# Patient Record
Sex: MALE | Race: WHITE | NOT HISPANIC OR LATINO | Employment: OTHER | URBAN - METROPOLITAN AREA
[De-identification: names, ages, dates, MRNs, and addresses within clinical notes are randomized per-mention and may not be internally consistent; named-entity substitution may affect disease eponyms.]

---

## 2018-01-23 ENCOUNTER — OFFICE VISIT - HEALTHEAST (OUTPATIENT)
Dept: CARDIOLOGY | Facility: CLINIC | Age: 63
End: 2018-01-23

## 2018-01-23 DIAGNOSIS — R07.89 CHEST TIGHTNESS: ICD-10-CM

## 2018-01-23 ASSESSMENT — MIFFLIN-ST. JEOR: SCORE: 1698.44

## 2018-02-01 ENCOUNTER — COMMUNICATION - HEALTHEAST (OUTPATIENT)
Dept: TELEHEALTH | Facility: CLINIC | Age: 63
End: 2018-02-01

## 2018-02-01 ENCOUNTER — HOSPITAL ENCOUNTER (OUTPATIENT)
Dept: CARDIOLOGY | Facility: CLINIC | Age: 63
Discharge: HOME | End: 2018-02-01
Attending: INTERNAL MEDICINE

## 2018-02-01 DIAGNOSIS — R07.89 CHEST TIGHTNESS: ICD-10-CM

## 2018-02-01 LAB
CV STRESS CURRENT BP HE: NORMAL
CV STRESS CURRENT HR HE: 102
CV STRESS CURRENT HR HE: 104
CV STRESS CURRENT HR HE: 105
CV STRESS CURRENT HR HE: 105
CV STRESS CURRENT HR HE: 106
CV STRESS CURRENT HR HE: 106
CV STRESS CURRENT HR HE: 108
CV STRESS CURRENT HR HE: 110
CV STRESS CURRENT HR HE: 111
CV STRESS CURRENT HR HE: 111
CV STRESS CURRENT HR HE: 112
CV STRESS CURRENT HR HE: 113
CV STRESS CURRENT HR HE: 115
CV STRESS CURRENT HR HE: 124
CV STRESS CURRENT HR HE: 125
CV STRESS CURRENT HR HE: 134
CV STRESS CURRENT HR HE: 134
CV STRESS CURRENT HR HE: 136
CV STRESS CURRENT HR HE: 139
CV STRESS CURRENT HR HE: 140
CV STRESS CURRENT HR HE: 143
CV STRESS CURRENT HR HE: 154
CV STRESS CURRENT HR HE: 157
CV STRESS CURRENT HR HE: 160
CV STRESS CURRENT HR HE: 160
CV STRESS CURRENT HR HE: 163
CV STRESS CURRENT HR HE: 168
CV STRESS CURRENT HR HE: 168
CV STRESS CURRENT HR HE: 82
CV STRESS CURRENT HR HE: 87
CV STRESS DEVIATION TIME HE: NORMAL
CV STRESS ECHO PERCENT HR HE: NORMAL
CV STRESS EXERCISE STAGE HE: NORMAL
CV STRESS FINAL RESTING BP HE: NORMAL
CV STRESS FINAL RESTING HR HE: 110
CV STRESS MAX HR HE: 169
CV STRESS MAX TREADMILL GRADE HE: 14
CV STRESS MAX TREADMILL SPEED HE: 3.4
CV STRESS PEAK DIA BP HE: NORMAL
CV STRESS PEAK SYS BP HE: NORMAL
CV STRESS PHASE HE: NORMAL
CV STRESS PROTOCOL HE: NORMAL
CV STRESS RESTING PT POSITION HE: NORMAL
CV STRESS RESTING PT POSITION HE: NORMAL
CV STRESS ST DEVIATION AMOUNT HE: NORMAL
CV STRESS ST DEVIATION ELEVATION HE: NORMAL
CV STRESS ST EVELATION AMOUNT HE: NORMAL
CV STRESS TEST TYPE HE: NORMAL
CV STRESS TOTAL STAGE TIME MIN 1 HE: NORMAL
STRESS ECHO BASELINE BP: NORMAL
STRESS ECHO BASELINE HR: 82
STRESS ECHO CALCULATED PERCENT HR: 106 %
STRESS ECHO LAST STRESS BP: NORMAL
STRESS ECHO LAST STRESS HR: 168
STRESS ECHO POST ESTIMATED WORKLOAD: 10.3
STRESS ECHO POST EXERCISE DUR MIN: 8
STRESS ECHO POST EXERCISE DUR SEC: 59
STRESS ECHO TARGET HR: 135

## 2021-05-31 VITALS — WEIGHT: 186 LBS | BODY MASS INDEX: 23.87 KG/M2 | HEIGHT: 74 IN

## 2021-06-15 NOTE — PROGRESS NOTES
Consultation - [facility]  Rakesh Pulido,  1955, MRN [id number]    PCP: Fran Huber MD, [phone]    Assessment and Plan: Atypical chest symptom does not appear to be an ischemic syndrome no evidence for myocardial ischemia but given his age and sex would suggest a evaluation by stress testing to exclude potential Heart Association.  Have recommended taking one baby aspirin daily.  Recommendations: Stress echo    Chief Complaint: Chest tightness    HPI:  We have been requested by ER  to evaluate Rakesh Pulido for consultation who is a  62 y.o. year old male for RAC.   Hx: 62-year-old man who is been in good health is evaluated for an episode of chest tightness.  Yesterday we had a heavy snowfall between 12 and 15 inches.  Last night and this morning he has been out vigorously snowplowing and shoveling snow at his residence and and neighbors as well.  He was driving his mother into a medical appointment he started notice a very central localized tightness of the low sternum the symptoms gradually resolved.  He has never had this symptom before.  He went to the emergency room for evaluation and no evidence of myocardial infarction or alternative cause was identified.  He was released and came here for further evaluation.  The symptoms remains resolved.  He has never had a similar symptom.  No associated symptoms with this episode.  History of hypertension diabetes hyperlipidemia tobacco smoking or family history of early onset heart disease.    Medical History  Active Ambulatory (Non-Hospital) Problems    Diagnosis     Chest tightness     No past medical history on file.    Surgical History  He  has no past surgical history on file.    Social History  Reviewed, and he  reports that he has never smoked. He has never used smokeless tobacco. He reports that he does not use illicit drugs.  Smoking status reviewed.  Social history othrwise not contributory to HPI.  Allergies  Allergies   Allergen  Reactions     Penicillins        Family History  Reviewed, and family history is not on file.  Extended Emergency Contact Information  Primary Emergency Contact: Michelle Pulido  [address] [address] [place]  Mobile Phone: [phone]  Relation: Spouse  Family history otherwise negative or not conributory to HPI.    Psychosocial Needs  Social History     Social History Narrative     No narrative on file     Additional psychosocial needs reviewed per nursing assessment.    Prior to Admission Medications    (Not in a hospital admission)    Review of Systems:  A 12 point comprehensive review of systems was negative except as noted.  Review of systems is negative except for HPI  Physical Exam:  Less than 10 mL of urine  Vitals:    01/23/18 1551   BP: 122/78   Pulse: 84   Resp: 16     Head and neck without focal cranial neurologic defects.  JVD not distended.  Carotid upstroke normal without bruit.  External eye exam normal without icterus.  External ear exam normal.  Neck without cervical lymphadenopathy or thyromegaly.  Lungs: clear to auscultation bilaterally  Heart: regular rate and rhythm, S1, S2 normal, no murmur, click, rub or gallop   Abdomen with normal bowel tones.  Skin without rash, ecchymosis, lesions.  Neuromuscular tone normal.  Peripheral pulse intact and equal.  Joints without swelling or erythema.      [unfilled]    Pertinent Labs  Lab Results: personally reviewed.   Lab Results   Component Value Date     01/23/2018    K 4.0 01/23/2018     01/23/2018    CO2 27 01/23/2018    BUN 15 01/23/2018    CREATININE 0.83 01/23/2018    CALCIUM 9.6 01/23/2018       Pertinent Radiology  Radiology Results: See Report  EKG Results: personally reviewed.  and See Report     No current outpatient prescriptions on file.

## 2021-06-16 PROBLEM — R07.89 CHEST TIGHTNESS: Status: ACTIVE | Noted: 2018-01-23

## 2022-04-16 ENCOUNTER — HOSPITAL ENCOUNTER (EMERGENCY)
Facility: CLINIC | Age: 67
Discharge: HOME | End: 2022-04-16
Attending: EMERGENCY MEDICINE | Admitting: EMERGENCY MEDICINE
Payer: MEDICARE

## 2022-04-16 ENCOUNTER — APPOINTMENT (OUTPATIENT)
Dept: CT IMAGING | Facility: CLINIC | Age: 67
End: 2022-04-16
Attending: EMERGENCY MEDICINE
Payer: MEDICARE

## 2022-04-16 VITALS
SYSTOLIC BLOOD PRESSURE: 134 MMHG | HEART RATE: 76 BPM | TEMPERATURE: 98.1 F | OXYGEN SATURATION: 95 % | RESPIRATION RATE: 20 BRPM | DIASTOLIC BLOOD PRESSURE: 83 MMHG

## 2022-04-16 DIAGNOSIS — S01.112A EYEBROW LACERATION, LEFT, INITIAL ENCOUNTER: ICD-10-CM

## 2022-04-16 DIAGNOSIS — S06.0X9A CONCUSSION WITH LOSS OF CONSCIOUSNESS, INITIAL ENCOUNTER: ICD-10-CM

## 2022-04-16 PROCEDURE — 12011 RPR F/E/E/N/L/M 2.5 CM/<: CPT

## 2022-04-16 PROCEDURE — 99284 EMERGENCY DEPT VISIT MOD MDM: CPT | Mod: 25

## 2022-04-16 PROCEDURE — 250N000011 HC RX IP 250 OP 636: Performed by: EMERGENCY MEDICINE

## 2022-04-16 PROCEDURE — 70450 CT HEAD/BRAIN W/O DYE: CPT

## 2022-04-16 RX ORDER — LIDOCAINE HYDROCHLORIDE AND EPINEPHRINE 10; 10 MG/ML; UG/ML
INJECTION, SOLUTION INFILTRATION; PERINEURAL
Status: DISCONTINUED
Start: 2022-04-16 | End: 2022-04-17 | Stop reason: HOSPADM

## 2022-04-16 RX ORDER — ONDANSETRON 4 MG/1
4 TABLET, ORALLY DISINTEGRATING ORAL ONCE
Status: COMPLETED | OUTPATIENT
Start: 2022-04-16 | End: 2022-04-16

## 2022-04-16 RX ORDER — ONDANSETRON 4 MG/1
4 TABLET, ORALLY DISINTEGRATING ORAL EVERY 8 HOURS PRN
Qty: 10 TABLET | Refills: 0 | Status: SHIPPED | OUTPATIENT
Start: 2022-04-16 | End: 2022-04-19

## 2022-04-16 RX ADMIN — ONDANSETRON 4 MG: 4 TABLET, ORALLY DISINTEGRATING ORAL at 21:45

## 2022-04-16 ASSESSMENT — ENCOUNTER SYMPTOMS
EYE PAIN: 0
NECK PAIN: 0
NAUSEA: 1
WOUND: 1
VOMITING: 0

## 2022-04-17 NOTE — ED TRIAGE NOTES
Pt states ground level fall tonight approx 1 hour pta. Pt unsure if LOC, but does not have memory of the incident. ABCs intact, alert and oriented to person and placed, not fully oriented to time.

## 2022-04-17 NOTE — ED PROVIDER NOTES
History   Chief Complaint:  Head Injury       HPI   Rakesh Pulido is an otherwise healthy 67 year old male who presents with a head injury that happened before arriving at the ED. The patient was going to check on his grand kids when he fell on ground level and obtained a laceration to his left eye as well as hitting his head. The patient denies remembering how he fell or what he hit his head on. His wife believes he hit his head on a table when he fell down based on where he was found and where his blood splattered. The patient had 2 glasses of wine at dinner before falling. The patient states that he is feeling nauseous. He denies neck pain, eye pain, double or blurry vision, and vomiting. He denies taking any medications, including blood thinners.    Review of Systems   Eyes: Negative for pain and visual disturbance.   Gastrointestinal: Positive for nausea. Negative for vomiting.   Musculoskeletal: Negative for neck pain.   Skin: Positive for wound (left eye).   All other systems reviewed and are negative.    Allergies:  Penicillins    Medications:  No current outpatient medications on file.    Past Medical History:     No past medical history on file.    Past Surgical History:    No past surgical history on file.     Family History:    Father: CAD  Mother: pacemaker    Social History:  The patient presents to the ED with his wife. The patient arrived to the ED via car and his daughter is still waiting for them in the parking lot.    Physical Exam     Patient Vitals for the past 24 hrs:   BP Temp Temp src Pulse Resp SpO2   04/16/22 2230 134/83 -- -- 76 -- 95 %   04/16/22 2200 135/82 -- -- 73 -- 95 %   04/16/22 2145 (!) 146/97 -- -- -- -- 95 %   04/16/22 2133 (!) 145/93 98.1  F (36.7  C) Oral 80 20 96 %       Physical Exam  General: Patient is alert, awake and interactive  Head: 2 cm linear laceration to the left eyebrow with some mild active oozing.  Eyes: The pupils are equal, round, and reactive to light.  "Conjunctivae and sclerae are normal  ENT: No hemotympanum or signs basilar skull fracture. The oropharynx is normal without erythema. No tenderness to palpation of the face, nose or jaw.   Neck: Normal range of motion. No cervical midline tenderness.   CV: Regular rate. S1/S2. No murmurs.   Resp: Lungs are clear without wheezes or rales. No distress. No crepitance.   GI: Abdomen is soft, no rigidity, guarding, or rebound. No contusion. No distension. No tenderness to palpation in any quadrant.    MS: Normal tone. Joints grossly normal without effusions. No asymmetric leg swelling, calf or thigh tenderness. Normal motor assessment of all extremities. PROM performed of all major joints without pain. No C/T/L tenderness in midline  Skin: No rash or lesions noted. Normal capillary refill noted  Neuro: Patient is oriented to self and place.  He is amnestic to the events of the fall.  However he is awake and alert and otherwise acting normally.  CN\"s II-XII intact. Speech is normal and fluent. Face is symmetric. Strength is normal and symmetric.   Psych: Normal affect.  Appropriate interactions.    Emergency Department Course     Imaging:  CT Head w/o Contrast   Final Result   IMPRESSION:   1.  Normal head CT.        Report per radiology    Procedures    Laceration Repair      Procedure: Laceration Repair    Indication: Laceration    Consent: Verbal    Location: Left eyebrow    Length: 2 cm    Preparation: Irrigation with Sterile Saline.    Anesthesia: Lidocaine with Epinephrine - 1%   Anxiolysis: None  Sedation: No sedation.      Treatment/Exploration: Wound explored, no foreign bodies found     Closure: The wound was closed in one layer. Skin was closed with 3 x 5.0 Polypropylene (prolene)  using Interrupted sutures.     Patient Status: The patient tolerated the procedure well: Yes. The patient tolerated the procedure well: Yes.       Emergency Department Course:     Reviewed:  I reviewed nursing notes, vitals, past " medical history and Care Everywhere    Assessments:  2134 I obtained history and examined the patient as noted above.   2250 I rechecked the patient and explained findings.     Interventions:  2145 Ondansetron 4 mg PO    Disposition:  The patient was discharged to home.     Impression & Plan     Medical Decision Making:  Rakesh Pulido is a 67 year old male who presents to the emergency department following head trauma.  Wife reports a mechanical fall in which the patient fell in the kitchen and hit his head on the corner of a table.  Patient is amnestic to the events.  Unclear if there was a brief loss of consciousness.  Patient is not anticoagulated.  CT of the head was obtained which was thankfully negative for any evidence of skull fracture or intracranial hemorrhage.  On physical exam he did have a small laceration to his left eyebrow that was cleaned, anesthetized and closed as above.  His patient has a history and clinical exam consistent with concussion.   The differential diagnosis includes skull fracture, concussion, epidural hematoma, subdural hematoma, intracerebral hemorrhage, and traumatic subarachnoid hemorrhage;  CT imaging is reassuring. Return to ED for red flags (change in behavior, drowsiness, seizures, vomiting, etc) and gave concussion precautions for home.  I did stress importance of avoiding a second concussion while brain heals.  The patients head to toe trauma exam is otherwise negative for serious underlying disease of the head, neck, chest, abdomen, extremities, pelvis.      Diagnosis:    ICD-10-CM    1. Eyebrow laceration, left, initial encounter  S01.112A    2. Concussion with loss of consciousness, initial encounter  S06.0X9A        Discharge Medications:  Discharge Medication List as of 4/16/2022 11:09 PM      START taking these medications    Details   ondansetron (ZOFRAN ODT) 4 MG ODT tab Take 1 tablet (4 mg) by mouth every 8 hours as needed for nausea, Disp-10 tablet, R-0, Local  Print             Scribe Disclosure:  I, Manan Rodas, am serving as a scribe at 9:35 PM on 4/16/2022 to document services personally performed by Bunny Hampton based on my observations and the provider's statements to me.        Bunny Hampton MD  04/17/22 [id number]

## 2024-10-01 ENCOUNTER — OFFICE VISIT (OUTPATIENT)
Dept: URBAN - METROPOLITAN AREA CLINIC 26 | Facility: CLINIC | Age: 69
End: 2024-10-01
Payer: COMMERCIAL

## 2024-10-01 DIAGNOSIS — H16.223 BILATERAL KERATOCONJUNCTIVITIS SICCA, NOT SPECIFIED AS SJOGREN'S: ICD-10-CM

## 2024-10-01 DIAGNOSIS — H25.13 AGE-RELATED NUCLEAR CATARACT, BILATERAL: Primary | ICD-10-CM

## 2024-10-01 DIAGNOSIS — H52.4 PRESBYOPIA: ICD-10-CM

## 2024-10-01 PROCEDURE — 92015 DETERMINE REFRACTIVE STATE: CPT | Performed by: OPTOMETRIST

## 2024-10-01 PROCEDURE — 92134 CPTRZ OPH DX IMG PST SGM RTA: CPT | Performed by: OPTOMETRIST

## 2024-10-01 PROCEDURE — 99204 OFFICE O/P NEW MOD 45 MIN: CPT | Performed by: OPTOMETRIST

## 2024-10-01 ASSESSMENT — KERATOMETRY
OD: 42.38
OS: 42.88

## 2024-10-01 ASSESSMENT — VISUAL ACUITY
OS: 20/40
OD: CF 3FT

## 2024-10-01 ASSESSMENT — INTRAOCULAR PRESSURE
OD: 13
OS: 13

## 2024-10-17 ENCOUNTER — TECH ONLY (OUTPATIENT)
Dept: URBAN - METROPOLITAN AREA CLINIC 24 | Facility: CLINIC | Age: 69
End: 2024-10-17
Payer: COMMERCIAL

## 2024-10-17 DIAGNOSIS — H25.13 AGE-RELATED NUCLEAR CATARACT, BILATERAL: Primary | ICD-10-CM

## 2024-10-17 DIAGNOSIS — Z01.818 ENCOUNTER FOR OTHER PREPROCEDURAL EXAMINATION: Primary | ICD-10-CM

## 2024-10-17 PROCEDURE — 99202 OFFICE O/P NEW SF 15 MIN: CPT | Performed by: PHYSICIAN ASSISTANT

## 2024-10-22 ENCOUNTER — OFFICE VISIT (OUTPATIENT)
Dept: URBAN - METROPOLITAN AREA CLINIC 26 | Facility: CLINIC | Age: 69
End: 2024-10-22
Payer: COMMERCIAL

## 2024-10-22 DIAGNOSIS — H25.13 AGE-RELATED NUCLEAR CATARACT, BILATERAL: Primary | ICD-10-CM

## 2024-10-22 DIAGNOSIS — H52.223 REGULAR ASTIGMATISM, BILATERAL: ICD-10-CM

## 2024-10-22 DIAGNOSIS — H16.223 BILATERAL KERATOCONJUNCTIVITIS SICCA, NOT SPECIFIED AS SJOGREN'S: ICD-10-CM

## 2024-10-22 PROCEDURE — 99204 OFFICE O/P NEW MOD 45 MIN: CPT | Performed by: OPHTHALMOLOGY

## 2024-10-22 RX ORDER — PREDNISOLONE ACETATE 10 MG/ML
1 % SUSPENSION/ DROPS OPHTHALMIC
Qty: 1 | Refills: 2 | Status: ACTIVE
Start: 2024-10-22

## 2024-10-22 ASSESSMENT — INTRAOCULAR PRESSURE
OD: 13
OS: 13

## 2024-10-28 ENCOUNTER — SURGERY (OUTPATIENT)
Dept: URBAN - METROPOLITAN AREA SURGERY 12 | Facility: SURGERY | Age: 69
End: 2024-10-28
Payer: COMMERCIAL

## 2024-10-28 PROCEDURE — 66982 XCAPSL CTRC RMVL CPLX WO ECP: CPT | Performed by: OPHTHALMOLOGY

## 2024-10-29 ENCOUNTER — POST-OPERATIVE VISIT (OUTPATIENT)
Dept: URBAN - METROPOLITAN AREA CLINIC 26 | Facility: CLINIC | Age: 69
End: 2024-10-29
Payer: COMMERCIAL

## 2024-10-29 DIAGNOSIS — Z48.810 ENCOUNTER FOR SURGICAL AFTERCARE FOLLOWING SURGERY ON A SENSE ORGAN: Primary | ICD-10-CM

## 2024-10-29 PROCEDURE — 99024 POSTOP FOLLOW-UP VISIT: CPT | Performed by: OPTOMETRIST

## 2024-10-29 ASSESSMENT — INTRAOCULAR PRESSURE: OD: 13

## 2024-11-04 ENCOUNTER — POST-OPERATIVE VISIT (OUTPATIENT)
Dept: URBAN - METROPOLITAN AREA CLINIC 26 | Facility: CLINIC | Age: 69
End: 2024-11-04
Payer: COMMERCIAL

## 2024-11-04 DIAGNOSIS — H52.4 PRESBYOPIA: Primary | ICD-10-CM

## 2024-11-04 DIAGNOSIS — Z48.810 ENCOUNTER FOR SURGICAL AFTERCARE FOLLOWING SURGERY ON A SENSE ORGAN: ICD-10-CM

## 2024-11-04 PROCEDURE — 99024 POSTOP FOLLOW-UP VISIT: CPT | Performed by: OPTOMETRIST

## 2024-11-04 PROCEDURE — 92015 DETERMINE REFRACTIVE STATE: CPT | Performed by: OPTOMETRIST

## 2024-11-04 ASSESSMENT — KERATOMETRY
OD: 42.50
OS: 42.50

## 2024-11-04 ASSESSMENT — INTRAOCULAR PRESSURE
OD: 14
OS: 11

## 2024-11-04 ASSESSMENT — VISUAL ACUITY
OD: 20/50
OS: 20/40

## 2024-11-11 ENCOUNTER — SURGERY (OUTPATIENT)
Dept: URBAN - METROPOLITAN AREA SURGERY 12 | Facility: SURGERY | Age: 69
End: 2024-11-11
Payer: COMMERCIAL

## 2024-11-11 PROCEDURE — 66984 XCAPSL CTRC RMVL W/O ECP: CPT | Performed by: OPHTHALMOLOGY

## 2024-11-12 ENCOUNTER — POST-OPERATIVE VISIT (OUTPATIENT)
Dept: URBAN - METROPOLITAN AREA CLINIC 26 | Facility: CLINIC | Age: 69
End: 2024-11-12
Payer: COMMERCIAL

## 2024-11-12 DIAGNOSIS — Z96.1 PRESENCE OF INTRAOCULAR LENS: Primary | ICD-10-CM

## 2024-11-12 PROCEDURE — 99024 POSTOP FOLLOW-UP VISIT: CPT | Performed by: OPTOMETRIST

## 2024-11-12 ASSESSMENT — INTRAOCULAR PRESSURE: OS: 18

## 2024-12-02 ENCOUNTER — POST-OPERATIVE VISIT (OUTPATIENT)
Dept: URBAN - METROPOLITAN AREA CLINIC 26 | Facility: CLINIC | Age: 69
End: 2024-12-02
Payer: MEDICARE

## 2024-12-02 DIAGNOSIS — Z96.1 PRESENCE OF INTRAOCULAR LENS: ICD-10-CM

## 2024-12-02 DIAGNOSIS — H52.4 PRESBYOPIA: Primary | ICD-10-CM

## 2024-12-02 PROCEDURE — 99024 POSTOP FOLLOW-UP VISIT: CPT | Performed by: OPTOMETRIST

## 2024-12-02 ASSESSMENT — VISUAL ACUITY
OD: 20/20
OS: 20/20

## 2024-12-02 ASSESSMENT — INTRAOCULAR PRESSURE
OS: 14
OD: 15